# Patient Record
Sex: MALE | Race: WHITE | NOT HISPANIC OR LATINO | Employment: OTHER | URBAN - METROPOLITAN AREA
[De-identification: names, ages, dates, MRNs, and addresses within clinical notes are randomized per-mention and may not be internally consistent; named-entity substitution may affect disease eponyms.]

---

## 2019-10-30 ENCOUNTER — HOSPITAL ENCOUNTER (OUTPATIENT)
Dept: TELEMEDICINE | Facility: HOSPITAL | Age: 64
Discharge: HOME | End: 2019-10-30

## 2019-10-30 PROCEDURE — G0425 PR INPT TELEHEALTH CONSULT 30M: ICD-10-PCS | Mod: GT,G0,, | Performed by: PSYCHIATRY & NEUROLOGY

## 2019-10-30 PROCEDURE — G0425 INPT/ED TELECONSULT30: HCPCS | Mod: GT,G0,, | Performed by: PSYCHIATRY & NEUROLOGY

## 2019-10-30 NOTE — CONSULTS
[facility]  Vascular Neurology  Comprehensive Stroke Center  Tele-Consultation Note      Consults    Consulting Provider: KIM FROST  Current Providers  No providers found    Patient Location: [facility] ED - [place] - [facility] Emergency Department  Spoke hospital nurse at bedside with patient assisting consultant.     Patient information was obtained from patient, spouse/SO and ED staff.         Assessment/Plan:   65 y/o without known PMH, presents with acute onset R sided weakness, R face droop, and slurred speech that started around 940 today.   NIHSS 5, CTH without acute abnormality.  Likely small acute L subcortical infarct.  SBP>200, thus recommended lowering the BP<180 and then administering iv alteplase.  STAT CTA head and neck AFTER tpa completion searching for LVO amenable to endovascular intervention.   Transfer to the nearest appropriate facility.  Neurology consult.        STROKE DOCUMENTATION     Acute Stroke Times:   Acute Stroke Times   Last Known Normal Date: 10/30/19  Last Known Normal Time: 0940  Symptom Onset Date: 10/30/19  Symptom Onset Time: 0940  Stroke Team Called Date: 10/30/19  Stroke Team Called Time: 1030  Stroke Team Arrival Date: 10/30/19  Stroke Team Arrival Time: 1035  CT Interpretation Time: 1035  Decision to Treat Time for Alteplase: 1040  Decision to Treat Time for IR: (Pending CTA results)    NIH Scale:  Interval: baseline  1a. Level of Consciousness: 0-->Alert, keenly responsive  1b. LOC Questions: 0-->Answers both questions correctly  1c. LOC Commands: 0-->Performs both tasks correctly  2. Best Gaze: 0-->Normal  3. Visual: 0-->No visual loss  4. Facial Palsy: 1-->Minor paralysis (flattened nasolabial fold, asymmetry on smiling)  5a. Motor Arm, Left: 0-->No drift, limb holds 90 (or 45) degrees for full 10 secs  5b. Motor Arm, Right: 0-->No drift, limb holds 90 (or 45) degrees for full 10  secs  6a. Motor Leg, Left: 1-->Drift, leg falls by the end of the 5-sec period but does not hit bed  6b. Motor Leg, Right: 1-->Drift, leg falls by the end of the 5-sec period but does not hit bed  7. Limb Ataxia: 0-->Absent  8. Sensory: 1-->Mild-to-moderate sensory loss, patient feels pinprick is less sharp or is dull on the affected side, or there is a loss of superficial pain with pinprick, but patient is aware of being touched  9. Best Language: 0-->No aphasia, normal  10. Dysarthria: 1-->Mild-to-moderate dysarthria, patient slurs at least some words and, at worst, can be understood with some difficulty  11. Extinction and Inattention (formerly Neglect): 0-->No abnormality  Total (NIH Stroke Scale): 5     Modified Pigeon Score: 0  Guild Coma Scale:15   ABCD2 Score:    [id number] Score:   HAS -BLED Score:   ICH Score:   Hunt & Weaver Classification:       Diagnoses: L brain infarct, acute  No new Assessment & Plan notes have been filed under this hospital service since the last note was generated.  Service: Vascular Neurology      There were no vitals taken for this visit.  Alteplase Eligible?: Yes  Alteplase Recommendation:   Alteplase Total Dose:   Total dose: Alteplase 0.9mg/kg (max dose:90mg)                      ** based on acquired weight from facility   Bolus Dose:   10% of total Alteplase dose given intravenously over 1 minute   Continuous Infusion Dose:   Remaining 90% of total Alteplase dose infused intravenously over 60 minutes    **infusion must start at the same time as the bolus dose     Additional Recommendations:   1. Neurological assessment and vital signs (except temperature) every 15 minutes during Altaplase infusion.  2. Frequency of BP assessments may need to be increased if systolic BP stays >= 180 mm Hg or diastolic BP stays >= 105 mm Hg. Administer antihypertensive meds as ordered  3. Continue to monitor and control blood pressure and monitor for neurological deterioration every 15  minutes for the first hour after the infusion is stopped. Then every 30 minutes for the next 6 hours. Perform hourly monitoring from the 8th post-infusion hour until 24 hours post-infusion.  4. Temperature every 4 hours or as required.  5. Follow hospital protocol for further orders re: post tPA infusion patient management.  6. No antithrombotics or anticoagulants (including but not limited to: heparin, warfarin, aspirin, clopidigrel, or dipyridamole) for 24 hours, then start antithrombotics as ordered by treating physician    Adapted from the American Heart Association/American Stroke Association (AHA/ASA) and American Association of Neuroscience Nurses (AANN) Guidelines.   Possible Interventional Revascularization Candidate? Yes    Disposition Recommendation: transfer to nearest appropriate facility     Subjective:     History of Present Illness: 63 y/o without known PMH, presents with acute onset R sided weakness, R face droop, and slurred speech that started around 940 today. Never had similar symptoms before.  Denies other associated neurological symptoms.  No improving.  No notes on file      Woke up with symptoms?: no    Recent bleeding noted: no  Does the patient take any Blood Thinners? no  Medications: No relevant medications      Past Medical History: no relevant history    Past Surgical History: no major surgeries within the last 2 weeks    Family History: no relevant history    Social History: unable to obtain    Allergies: Allergies have not been reviewed No known drug allergies    Review of Systems   Constitutional: Negative for chills, diaphoresis and fever.   HENT: Negative for hearing loss, tinnitus and trouble swallowing.    Eyes: Negative for photophobia and visual disturbance.   Respiratory: Negative for shortness of breath.    Cardiovascular: Negative for chest pain and palpitations.   Gastrointestinal: Negative for vomiting.   Endocrine: Negative for cold intolerance.   Genitourinary: Negative  for hematuria.   Musculoskeletal: Negative for neck pain and neck stiffness.   Allergic/Immunologic: Negative for immunocompromised state.   Neurological: Positive for facial asymmetry, speech difficulty, weakness and numbness. Negative for dizziness and headaches.   Hematological: Does not bruise/bleed easily.   Psychiatric/Behavioral: Negative for agitation, behavioral problems and confusion.     Objective:   Vitals: There were no vitals taken for this visit. BP: 200/100, Respiratory Rate: 17 and Heart Rate: 78    CT READ: Yes  No hemmorhage. No mass effect. No early infarct signs.     Physical Exam   Constitutional: He is oriented to person, place, and time. He appears well-developed and well-nourished.   HENT:   Head: Normocephalic and atraumatic.   Eyes: Pupils are equal, round, and reactive to light. EOM are normal.   Neck: Normal range of motion. Neck supple.   Cardiovascular: Normal rate and regular rhythm.   Pulmonary/Chest: No respiratory distress.   Abdominal: He exhibits no mass.   Genitourinary:   Genitourinary Comments: No performed   Musculoskeletal: He exhibits no edema or deformity.   Neurological: He is alert and oriented to person, place, and time. A cranial nerve deficit and sensory deficit is present. Coordination normal.   Skin: No rash noted. He is not diaphoretic. No erythema.   Psychiatric: He has a normal mood and affect. His behavior is normal.   Nursing note and vitals reviewed.            Recommended the emergency room physician to have a brief discussion with the patient and/or family if available regarding the risks and benefits of treatment, and to briefly document the occurrence of that discussion in his clinical encounter note.     The attending portion of this evaluation, treatment, and documentation was performed per Oracio Kang MD via audiovisual.    Billing code:  (moderate to severe stroke, large areas of edema, some mimics)    · This patient has a critical  neurological condition/illness, with high morbidity and mortality.  · There is a high probability for acute neurological change leading to clinical and possibly life-threatening deterioration requiring highest level of physician preparedness for urgent intervention.  · Care was coordinated with other physicians involved in the patient's care.  · Radiologic studies and laboratory data were reviewed and interpreted, and plan of care was re-assessed based on the results.  · Diagnosis, treatment options and prognosis may have been discussed with the patient and/or family members or caregiver.  · Further advanced medical management and further evaluation is warranted for his care.      In your opinion, this was a: Tier 1 Van Negative    Consult End Time: 1047 am    Oracio Kang MD  Comprehensive Stroke Center  Vascular Neurology   [facility]

## 2019-10-30 NOTE — SUBJECTIVE & OBJECTIVE
Woke up with symptoms?: no    Recent bleeding noted: no  Does the patient take any Blood Thinners? no  Medications: No relevant medications      Past Medical History: no relevant history    Past Surgical History: no major surgeries within the last 2 weeks    Family History: no relevant history    Social History: unable to obtain    Allergies: Allergies have not been reviewed No known drug allergies    Review of Systems   Constitutional: Negative for chills, diaphoresis and fever.   HENT: Negative for hearing loss, tinnitus and trouble swallowing.    Eyes: Negative for photophobia and visual disturbance.   Respiratory: Negative for shortness of breath.    Cardiovascular: Negative for chest pain and palpitations.   Gastrointestinal: Negative for vomiting.   Endocrine: Negative for cold intolerance.   Genitourinary: Negative for hematuria.   Musculoskeletal: Negative for neck pain and neck stiffness.   Allergic/Immunologic: Negative for immunocompromised state.   Neurological: Positive for facial asymmetry, speech difficulty, weakness and numbness. Negative for dizziness and headaches.   Hematological: Does not bruise/bleed easily.   Psychiatric/Behavioral: Negative for agitation, behavioral problems and confusion.     Objective:   Vitals: There were no vitals taken for this visit. BP: 200/100, Respiratory Rate: 17 and Heart Rate: 78    CT READ: Yes  No hemmorhage. No mass effect. No early infarct signs.     Physical Exam   Constitutional: He is oriented to person, place, and time. He appears well-developed and well-nourished.   HENT:   Head: Normocephalic and atraumatic.   Eyes: Pupils are equal, round, and reactive to light. EOM are normal.   Neck: Normal range of motion. Neck supple.   Cardiovascular: Normal rate and regular rhythm.   Pulmonary/Chest: No respiratory distress.   Abdominal: He exhibits no mass.   Genitourinary:   Genitourinary Comments: No performed   Musculoskeletal: He exhibits no edema or  deformity.   Neurological: He is alert and oriented to person, place, and time. A cranial nerve deficit and sensory deficit is present. Coordination normal.   Skin: No rash noted. He is not diaphoretic. No erythema.   Psychiatric: He has a normal mood and affect. His behavior is normal.   Nursing note and vitals reviewed.

## 2020-03-04 PROBLEM — I63.412 CEREBROVASCULAR ACCIDENT (CVA) DUE TO EMBOLISM OF LEFT MIDDLE CEREBRAL ARTERY: Status: ACTIVE | Noted: 2019-10-30

## 2020-03-04 PROBLEM — I10 HTN (HYPERTENSION): Status: ACTIVE | Noted: 2019-10-30

## 2021-07-25 PROBLEM — D64.9 SYMPTOMATIC ANEMIA: Status: ACTIVE | Noted: 2021-07-25

## 2021-07-25 PROBLEM — R53.1 RIGHT SIDED WEAKNESS: Status: ACTIVE | Noted: 2021-07-25

## 2021-07-26 PROBLEM — R07.9 CHEST PAIN: Status: ACTIVE | Noted: 2021-07-26

## 2021-07-27 PROBLEM — R10.9 ABDOMINAL PAIN: Status: ACTIVE | Noted: 2021-07-27

## 2021-08-04 PROBLEM — E66.9 OBESITY: Status: ACTIVE | Noted: 2019-10-30

## 2021-08-04 PROBLEM — M17.30 POST-TRAUMATIC OSTEOARTHRITIS OF KNEE: Status: ACTIVE | Noted: 2018-01-08

## 2021-08-04 PROBLEM — S89.92XA LEFT KNEE INJURY: Status: ACTIVE | Noted: 2017-05-25

## 2021-08-04 PROBLEM — G81.91 HEMIPARESIS OF RIGHT DOMINANT SIDE: Status: ACTIVE | Noted: 2019-10-30

## 2021-08-06 ENCOUNTER — SPECIALTY PHARMACY (OUTPATIENT)
Dept: PHARMACY | Facility: CLINIC | Age: 66
End: 2021-08-06

## 2021-08-09 PROBLEM — R53.83 FATIGUE: Status: ACTIVE | Noted: 2021-08-09

## 2021-08-25 PROBLEM — K02.9 DENTAL CARIES EXTENDING INTO PULP: Chronic | Status: ACTIVE | Noted: 2021-08-25

## 2021-08-28 PROBLEM — D64.81 ANEMIA ASSOCIATED WITH CHEMOTHERAPY: Status: ACTIVE | Noted: 2021-08-28

## 2021-08-28 PROBLEM — T45.1X5A ANEMIA ASSOCIATED WITH CHEMOTHERAPY: Status: ACTIVE | Noted: 2021-08-28

## 2021-08-31 PROBLEM — R21 RASH: Status: ACTIVE | Noted: 2021-08-31

## 2021-09-06 PROBLEM — K02.9 CARIES: Status: ACTIVE | Noted: 2021-08-25

## 2021-09-13 PROBLEM — R09.89 SUSPECTED CEREBROVASCULAR ACCIDENT (CVA): Status: ACTIVE | Noted: 2021-09-13

## 2021-09-16 PROBLEM — E83.51 HYPOCALCEMIA: Status: ACTIVE | Noted: 2021-09-16

## 2021-09-20 PROBLEM — K92.2 UPPER GI BLEED: Status: ACTIVE | Noted: 2021-09-20

## 2021-09-24 PROBLEM — N17.0 ATN (ACUTE TUBULAR NECROSIS): Status: ACTIVE | Noted: 2021-09-24

## 2021-09-24 PROBLEM — E83.42 HYPOMAGNESEMIA: Status: ACTIVE | Noted: 2021-09-24

## 2021-09-24 PROBLEM — N17.9 AKI (ACUTE KIDNEY INJURY): Status: ACTIVE | Noted: 2021-09-24

## 2021-09-24 PROBLEM — J81.1 PULMONARY EDEMA: Status: ACTIVE | Noted: 2021-09-24

## 2021-10-02 PROBLEM — R04.2 HEMOPTYSIS: Status: ACTIVE | Noted: 2021-10-02

## 2021-10-02 PROBLEM — J44.9 COPD (CHRONIC OBSTRUCTIVE PULMONARY DISEASE): Status: ACTIVE | Noted: 2021-10-02

## 2021-10-02 PROBLEM — J96.01 ACUTE HYPOXEMIC RESPIRATORY FAILURE: Status: ACTIVE | Noted: 2021-10-02

## 2021-10-03 PROBLEM — R05.9 COUGH: Status: ACTIVE | Noted: 2021-10-03

## 2021-10-03 PROBLEM — A49.8 CLOSTRIDIUM DIFFICILE INFECTION: Status: ACTIVE | Noted: 2021-10-03

## 2021-10-11 PROBLEM — R50.9 FEVER: Status: ACTIVE | Noted: 2021-10-11

## 2021-10-16 PROBLEM — E83.52 HYPERCALCEMIA: Status: ACTIVE | Noted: 2021-10-16

## 2021-10-28 PROBLEM — R50.9 FEVER: Status: RESOLVED | Noted: 2021-10-11 | Resolved: 2021-10-28

## 2021-10-28 PROBLEM — A49.8 CLOSTRIDIUM DIFFICILE INFECTION: Status: RESOLVED | Noted: 2021-10-03 | Resolved: 2021-10-28

## 2021-10-28 PROBLEM — E83.42 HYPOMAGNESEMIA: Status: RESOLVED | Noted: 2021-09-24 | Resolved: 2021-10-28

## 2021-10-28 PROBLEM — R10.9 ABDOMINAL PAIN: Status: RESOLVED | Noted: 2021-07-27 | Resolved: 2021-10-28

## 2021-10-28 PROBLEM — E83.52 HYPERCALCEMIA: Status: RESOLVED | Noted: 2021-10-16 | Resolved: 2021-10-28

## 2021-10-28 PROBLEM — K02.9 CARIES: Status: RESOLVED | Noted: 2021-08-25 | Resolved: 2021-10-28

## 2021-10-28 PROBLEM — R21 RASH: Status: RESOLVED | Noted: 2021-08-31 | Resolved: 2021-10-28

## 2021-10-28 PROBLEM — R05.9 COUGH: Status: RESOLVED | Noted: 2021-10-03 | Resolved: 2021-10-28

## 2021-10-28 PROBLEM — J96.01 ACUTE HYPOXEMIC RESPIRATORY FAILURE: Status: RESOLVED | Noted: 2021-10-02 | Resolved: 2021-10-28

## 2021-10-28 PROBLEM — E83.51 HYPOCALCEMIA: Status: RESOLVED | Noted: 2021-09-16 | Resolved: 2021-10-28

## 2021-10-28 PROBLEM — K92.2 UPPER GI BLEED: Status: RESOLVED | Noted: 2021-09-20 | Resolved: 2021-10-28

## 2021-10-28 PROBLEM — R04.2 HEMOPTYSIS: Status: RESOLVED | Noted: 2021-10-02 | Resolved: 2021-10-28

## 2021-10-28 PROBLEM — D64.9 SYMPTOMATIC ANEMIA: Status: RESOLVED | Noted: 2021-07-25 | Resolved: 2021-10-28

## 2021-10-29 PROBLEM — N40.1 BENIGN PROSTATIC HYPERPLASIA (BPH) WITH STRAINING ON URINATION: Status: ACTIVE | Noted: 2021-10-29

## 2021-10-29 PROBLEM — R39.16 BENIGN PROSTATIC HYPERPLASIA (BPH) WITH STRAINING ON URINATION: Status: ACTIVE | Noted: 2021-10-29

## 2021-11-04 PROBLEM — I63.9 CEREBROVASCULAR ACCIDENT (CVA): Status: ACTIVE | Noted: 2019-10-30

## 2021-11-12 PROBLEM — N17.9 AKI (ACUTE KIDNEY INJURY): Status: RESOLVED | Noted: 2021-09-24 | Resolved: 2021-11-12

## 2021-11-12 PROBLEM — J81.1 PULMONARY EDEMA: Status: RESOLVED | Noted: 2021-09-24 | Resolved: 2021-11-12

## 2021-11-12 PROBLEM — R09.89 SUSPECTED CEREBROVASCULAR ACCIDENT (CVA): Status: RESOLVED | Noted: 2021-09-13 | Resolved: 2021-11-12

## 2021-12-09 ENCOUNTER — SPECIALTY PHARMACY (OUTPATIENT)
Dept: PHARMACY | Facility: CLINIC | Age: 66
End: 2021-12-09

## 2021-12-09 NOTE — TELEPHONE ENCOUNTER
"Received oral azacitidine prescription which only states, "14 days every 28 days." [employer] message sent to KATHARINE Piedra requesting they send new prescription with the following directions, "take one tablet by mouth on days 1-14 of each 28 day cycle."    Unable to find prescription insurance through eligibility check. Will call Mr. Naik to confirm during normal business hours.   "

## 2021-12-09 NOTE — TELEPHONE ENCOUNTER
Called Mr. Naik and performed welcome call. He confirmed he does not have prescription insurance. Household size, monthly income, and place to send application sent to Jenna CRAFT for future use once proper prescription is received for azacitidine 300 mg tablet (take one tablet by mouth on days 1-14 of each 28-day cycle)

## 2021-12-10 NOTE — TELEPHONE ENCOUNTER
Assisting patient with applying for  PAP for Onureg. Mailed assistance application to address on file as requested for patient to review, sign and return to [facility]. Will continue to follow up.    Sent a staff message to [employer] regarding Onureg assistance application and faxed application prescription to [phone] for provider review and signature. Will continue to follow up.

## 2021-12-13 NOTE — TELEPHONE ENCOUNTER
Refaxed provider portion of assistance application to [employer] at [phone] as requested for provider review and signature. Will continue to follow up.

## 2021-12-14 NOTE — TELEPHONE ENCOUNTER
Provider portion received. Submission pending patient portion - mailed to address on file for patient to review, sign and return to [facility]. Will continue to follow up.

## 2021-12-21 NOTE — TELEPHONE ENCOUNTER
Called patient to check on his portion of the Onureg assistance application. Patient confirmed that he received application in mail for him to sign and he put completed application in the mail yesterday to return to [facility]. Will await arrival of patient portion of application at [facility].

## 2021-12-28 NOTE — TELEPHONE ENCOUNTER
Received patient portion of assistance application and faxed completed assistance application to [employer] for processing and review. Will continue to follow up until final determination is made.

## 2022-01-06 PROBLEM — R07.9 CHEST PAIN: Status: RESOLVED | Noted: 2021-07-26 | Resolved: 2022-01-06

## 2022-01-06 PROBLEM — N17.9 AKI (ACUTE KIDNEY INJURY): Status: RESOLVED | Noted: 2021-09-24 | Resolved: 2022-01-06

## 2022-01-06 NOTE — TELEPHONE ENCOUNTER
Incoming call from Ashley Piedra at [employer] asking for CORINNE Lutz--unavailable at the time of this call. [employer] would like to know average turnaround time from assistance completion to med-in-hand as they would like to know when to follow-up w/ patient to schedule the next appt. She is available for response via teams and secure chat. Forwarded to Jenna CRAFT

## 2022-01-11 NOTE — TELEPHONE ENCOUNTER
Called [facility] at [phone] to check on the status of patients Onureg assistance application. Rep Chong confirmed prescription was received on 1/6 and application is still being processed. Arlette provided ETAT of 24 to 48 business hours for a final determination to made. Will continue to follow up until final determination is made.

## 2022-01-19 NOTE — TELEPHONE ENCOUNTER
Called [facility] at [phone] to check on the status of patients Onureg assistance application. Rep reports they were not able to verify patients income and proof of income is required. Rep says that the patient was notified of needed documents on 1/11/22 and they also mailed him additional information today on 1/19/22. Will follow up with patient regarding proof of income for Onureg PAP application.

## 2022-01-26 NOTE — TELEPHONE ENCOUNTER
No answer, LVM. Called patient to follow up on Onureg FA. Proof of income needed for PAP application. Will continue to follow up.

## 2022-02-09 NOTE — TELEPHONE ENCOUNTER
2nd call attempt. No answer, LVM. Called patient to follow up on Onureg FA. Proof of income needed for PAP application. Will continue to follow up.

## 2022-02-10 NOTE — TELEPHONE ENCOUNTER
Incoming call. Patient returned phone call regarding Onureg FA and says he already sent his proof of income to the program and he has been approved for PAP. Patient says he received his first shipment and is on his 3rd day of taking Onureg. Will follow up with manufacture to confirm patient is approved through the end of the year and that no additional information is needed.

## 2022-02-10 NOTE — TELEPHONE ENCOUNTER
Called [employer] and confirmed patient is approved for [employer] [facility] [employer] Patient Assistance Program as of 1/25/22 through 12/31/22 and will receive Onureg free of charge through the programs dispensing pharmacy, [facility]. Patient has already received first shipment and will need to contact [employer] [facility] [employer] Patient Assistance Program at [phone] for future refills.      FOR DOCUMENTATION ONLY:  Financial Assistance for Onureg is approved from 1/25/22 to 12/31/22  Source: [employer] Patient Assistance Program  Case ID: [id number]  Phone: [phone]  Fax: [phone]   Pharmacy: [facility]

## 2022-02-14 NOTE — TELEPHONE ENCOUNTER
Approved for PAP. Filling at [facility] and per telephone encounter from 1/31 with KATHARINE Piedra, he has already received oral azacitidine. Referral closed.

## 2022-07-29 PROBLEM — Z86.73 H/O: CVA (CEREBROVASCULAR ACCIDENT): Status: ACTIVE | Noted: 2022-07-29

## 2022-11-22 PROBLEM — R53.83 FATIGUE: Status: RESOLVED | Noted: 2021-08-09 | Resolved: 2022-11-22

## 2022-12-01 ENCOUNTER — PATIENT MESSAGE (OUTPATIENT)
Dept: PHARMACY | Facility: CLINIC | Age: 67
End: 2022-12-01

## 2023-03-13 ENCOUNTER — SPECIALTY PHARMACY (OUTPATIENT)
Dept: PHARMACY | Facility: CLINIC | Age: 68
End: 2023-03-13

## 2023-03-13 NOTE — TELEPHONE ENCOUNTER
Francis, this is Haris Gonzalez with [facility].  We are working on your prescription that your doctor has sent us. We will be working with your insurance to get this approved for you. We will be calling you along the way with updates on your medication.  If you have any questions, you can reach us at [phone].    Welcome call outcome: Patient/caregiver reached    Pt stated he's no longer on a PAP program.     PA required and in progress. Key: SAMMY ALCANTAR initially denied. Called and completed appeal over the phone with Meggan  Case ID: [id number]    Meggan MCDANIEL

## 2023-03-14 NOTE — TELEPHONE ENCOUNTER
PA appeal approved. From 01/01/23 through 03/12/24.   Test claim: $4,144.94. Releasing script to BI/FA. Pt might benefit from FA.    Benefits Investigation: Completed  Insurance name: [employer] Silver script Medicare Part D   Copay: $4,144.94  LIS LVL: none

## 2023-03-15 NOTE — TELEPHONE ENCOUNTER
Patient conditionally approved for [employer] christophe due to patient must submit proof of income for income verification. Will notify patient.

## 2023-03-21 NOTE — TELEPHONE ENCOUNTER
Notified patient [employer] christophe is available and will need proof of income for income verification. Patient voiced understanding and will mail a copy of his and his wife SS Award Letters to [employer]. Provided [employer] mailing address. Will continue to follow up.

## 2023-03-24 NOTE — TELEPHONE ENCOUNTER
Patient proof of income received. Submitted documents to [employer] via online portal for income verification. Will continue to follow up.

## 2023-03-28 NOTE — TELEPHONE ENCOUNTER
[facility] Income Verification still being processed. Will continue to follow up until final determination is made.

## 2023-03-30 NOTE — TELEPHONE ENCOUNTER
Outgoing call to [employer] to check status of Income Verification and rep says income verification is still in process and they are needing patients wife's 2023 SS Benefits Statement. They received her 2022 statement but they can not accept that as valid proof of income. Rep says they have tried to reach patient 2 times to request updated income document however they have not been able to reach him and patients mailbox is full.     Outgoing call to notify patient that his wife's 2023 SS Benefits Statement is needed to complete [facility] Income Verification. See above note. No answer, unable to LVM. Will continue to follow up.

## 2023-04-19 NOTE — TELEPHONE ENCOUNTER
2nd call attempt to pt about wife' SS award letter for [employer]. No answer/unable to LVM. Will continue to follow up.

## 2023-09-28 PROBLEM — S89.92XA LEFT KNEE INJURY: Status: RESOLVED | Noted: 2017-05-25 | Resolved: 2023-09-28

## 2023-12-08 PROBLEM — Z72.0 TOBACCO ABUSE: Status: ACTIVE | Noted: 2023-12-08

## 2023-12-08 PROBLEM — G89.0: Status: ACTIVE | Noted: 2023-12-08

## 2023-12-08 PROBLEM — I65.23 BILATERAL CAROTID ARTERY STENOSIS: Status: ACTIVE | Noted: 2023-12-08

## 2023-12-08 PROBLEM — Z72.0 TOBACCO ABUSE: Status: RESOLVED | Noted: 2023-12-08 | Resolved: 2023-12-08

## 2023-12-28 PROBLEM — E78.5 DYSLIPIDEMIA: Status: ACTIVE | Noted: 2023-12-28

## 2023-12-28 PROBLEM — I69.30 SEQUELAE OF CEREBRAL INFARCTION: Status: ACTIVE | Noted: 2023-12-28

## 2023-12-28 PROBLEM — N40.1 LOWER URINARY TRACT SYMPTOMS DUE TO BENIGN PROSTATIC HYPERPLASIA: Status: ACTIVE | Noted: 2023-12-28

## 2024-07-23 PROBLEM — T45.1X5A ANEMIA ASSOCIATED WITH CHEMOTHERAPY: Status: RESOLVED | Noted: 2021-08-28 | Resolved: 2024-07-23

## 2024-07-23 PROBLEM — D64.81 ANEMIA ASSOCIATED WITH CHEMOTHERAPY: Status: RESOLVED | Noted: 2021-08-28 | Resolved: 2024-07-23

## 2024-09-21 PROBLEM — S82.141A TIBIAL PLATEAU FRACTURE, RIGHT, CLOSED, INITIAL ENCOUNTER: Status: ACTIVE | Noted: 2024-09-21
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
## 2024-09-21 PROBLEM — S22.079A CLOSED FRACTURE OF NINTH THORACIC VERTEBRA: Status: ACTIVE | Noted: 2024-09-21

## 2024-09-22 PROBLEM — S22.009A CLOSED TRAUMATIC COMPRESSION FRACTURE OF THORACIC VERTEBRA: Status: ACTIVE | Noted: 2024-09-22

## 2024-09-22 PROBLEM — E78.5 HLD (HYPERLIPIDEMIA): Status: ACTIVE | Noted: 2024-09-22

## 2024-09-22 PROBLEM — C92.01 ACUTE MYELOID LEUKEMIA IN REMISSION: Status: ACTIVE | Noted: 2024-09-22

## 2024-09-22 PROBLEM — S22.009A CLOSED TRAUMATIC COMPRESSION FRACTURE OF THORACIC VERTEBRA: Status: RESOLVED | Noted: 2024-09-22 | Resolved: 2024-09-22

## 2024-09-22 PROBLEM — I10 PRIMARY HYPERTENSION: Status: ACTIVE | Noted: 2024-09-22
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
## 2024-09-22 PROBLEM — I50.20 HFREF (HEART FAILURE WITH REDUCED EJECTION FRACTION): Status: ACTIVE | Noted: 2024-09-22

## 2024-09-26 PROBLEM — S82.831A CLOSED FRACTURE OF UPPER END OF RIGHT FIBULA: Status: ACTIVE | Noted: 2024-09-26

## 2024-09-26 PROBLEM — Z98.890 H/O FASCIOTOMY: Status: ACTIVE | Noted: 2024-09-26

## 2024-09-26 PROBLEM — S83.8X1A ACUTE LATERAL MENISCAL INJURY OF RIGHT KNEE: Status: ACTIVE | Noted: 2024-09-26

## 2024-10-02 ENCOUNTER — PATIENT OUTREACH (OUTPATIENT)
Dept: ADMINISTRATIVE | Facility: CLINIC | Age: 69
End: 2024-10-02

## 2024-10-02 NOTE — PROGRESS NOTES
C3 nurse spoke with Kelvin Naik  for a TCC post hospital discharge follow up call. The patient reports does not have a scheduled HOSFU appointment. C3 nurse was unable to schedule HOSFU appointment for [facility]. Patient advised to contact their PCP to schedule a HOSPFU within 5-7 days.

## 2024-10-02 NOTE — PROGRESS NOTES
C3 nurse attempted to contact Kelvin Naik  for a TCC post hospital discharge follow up call. No answer. No voicemail available. The patient has a scheduled establish care appointment with [facility] on 10/14/24 @ 1400.